# Patient Record
Sex: FEMALE | Race: WHITE
[De-identification: names, ages, dates, MRNs, and addresses within clinical notes are randomized per-mention and may not be internally consistent; named-entity substitution may affect disease eponyms.]

---

## 2017-09-26 ENCOUNTER — HOSPITAL ENCOUNTER (OUTPATIENT)
Dept: HOSPITAL 57 - BURLAB | Age: 72
Discharge: HOME | End: 2017-09-26
Payer: MEDICARE

## 2017-09-26 DIAGNOSIS — E55.9: Primary | ICD-10-CM

## 2017-09-26 DIAGNOSIS — D50.9: ICD-10-CM

## 2017-09-26 DIAGNOSIS — N18.4: ICD-10-CM

## 2017-09-26 DIAGNOSIS — I12.9: ICD-10-CM

## 2017-09-26 LAB
ANION GAP SERPL CALC-SCNC: 14 MMOL/L (ref 10–20)
BUN SERPL-MCNC: 17 MG/DL (ref 9.8–20.1)
CALCIUM SERPL-MCNC: 9.7 MG/DL (ref 7.8–10.44)
CHLORIDE SERPL-SCNC: 110 MMOL/L (ref 98–107)
CO2 SERPL-SCNC: 24 MMOL/L (ref 23–31)
CREAT CL PREDICTED SERPL C-G-VRATE: 0 ML/MIN (ref 70–130)
GLUCOSE SERPL-MCNC: 103 MG/DL (ref 83–110)
HGB BLD-MCNC: 11.6 G/DL (ref 12–16)
POTASSIUM SERPL-SCNC: 4 MMOL/L (ref 3.5–5.1)
PROT UR-MCNC: (no result) MG/DL
SODIUM SERPL-SCNC: 144 MMOL/L (ref 136–145)

## 2017-09-26 PROCEDURE — 85018 HEMOGLOBIN: CPT

## 2017-09-26 PROCEDURE — 84156 ASSAY OF PROTEIN URINE: CPT

## 2017-09-26 PROCEDURE — 84100 ASSAY OF PHOSPHORUS: CPT

## 2017-09-26 PROCEDURE — 82306 VITAMIN D 25 HYDROXY: CPT

## 2017-09-26 PROCEDURE — 36415 COLL VENOUS BLD VENIPUNCTURE: CPT

## 2017-09-26 PROCEDURE — 83970 ASSAY OF PARATHORMONE: CPT

## 2017-09-26 PROCEDURE — 82570 ASSAY OF URINE CREATININE: CPT

## 2017-09-26 PROCEDURE — 85014 HEMATOCRIT: CPT

## 2017-09-26 PROCEDURE — 80048 BASIC METABOLIC PNL TOTAL CA: CPT

## 2017-09-27 ENCOUNTER — HOSPITAL ENCOUNTER (OUTPATIENT)
Dept: HOSPITAL 57 - BURRAD | Age: 72
Discharge: HOME | End: 2017-09-27
Attending: FAMILY MEDICINE
Payer: MEDICARE

## 2017-09-27 DIAGNOSIS — R91.8: ICD-10-CM

## 2017-09-27 DIAGNOSIS — J42: Primary | ICD-10-CM

## 2017-09-27 PROCEDURE — 71020: CPT

## 2017-09-27 NOTE — RAD
CHEST TWO VIEWS:

9/27/17

 

No prior films are available for comparison. 

 

There is a little increased density around the right hilum and along its inferior aspect. This may j
ust be a small infiltrate, though I cannot rule out other pathology here. The left lung is clear. A 
calcified granuloma is seen in the left costophrenic angle. There are no effusions. The heart size i
s normal and the trachea is midline. 

 

IMPRESSION:  

Some increased density and prominence around the region of the right hilum. I cannot tell if it is s
olely infiltrative change or if other pathology is present. I would recommend treating the patient f
or pneumonia, then repeating the chest x-ray after treatment. If it is not cleared completely, then 
consider a CT of the thorax. Alternatively, one could do a CT sooner if the clinical history dictate
d. If would be preferred, should a CT be needed, to do it with IV contrast if that is possible. 

 

Code T

 

POS: HOME

## 2017-10-03 ENCOUNTER — HOSPITAL ENCOUNTER (OUTPATIENT)
Dept: HOSPITAL 57 - BURRAD | Age: 72
Discharge: HOME | End: 2017-10-03
Attending: FAMILY MEDICINE
Payer: MEDICARE

## 2017-10-03 DIAGNOSIS — J42: Primary | ICD-10-CM

## 2017-10-03 DIAGNOSIS — R91.8: ICD-10-CM

## 2017-10-03 PROCEDURE — 71020: CPT

## 2017-10-03 NOTE — RAD
CHEST TWO VIEWS

10/3/17

 

Comparison is made with the study of 9/27/17. 

 

While it is not quite as streaky around the right hilum as it was before, there is still a little ex
tra density here. I am more concerned that on the lateral view, there is sort of a rounded area of i
ncreased density in the anterior chest, possibly right middle lobe. Given a patient with COPD and th
is not immediately resolving, I feel strongly that an elective CT scan needs to be done to rule out 
mass or other pathology here. The lungs are otherwise clear. A calcified granuloma is seen in the le
ft costophrenic angle. The heart size remains normal. 

 

IMPRESSION:  

Slight improvement since 9/27 but a vague poorly marginated rounded density seen anteriorly on the l
ateral view remains of concern. CT strongly recommended to remove all doubt. 

 

Code T

 

POS: HOME

## 2017-10-05 ENCOUNTER — HOSPITAL ENCOUNTER (OUTPATIENT)
Dept: HOSPITAL 57 - BURCT | Age: 72
Discharge: HOME | End: 2017-10-05
Attending: FAMILY MEDICINE
Payer: MEDICARE

## 2017-10-05 DIAGNOSIS — R93.8: Primary | ICD-10-CM

## 2017-10-05 DIAGNOSIS — I25.10: ICD-10-CM

## 2017-10-05 PROCEDURE — 71250 CT THORAX DX C-: CPT

## 2017-10-05 NOTE — CT
CT OF THE THORAX WITHOUT CONTRAST

10/05/2017

 

Spiral CT of the chest was performed without IV contrast for evaluation of an abnormal chest x-ray. 
 IV contrast was deferred due to a low GFR.  Axial slices were acquired and then coronal reconstruct
ions were done.

 

There is an area of consolidation with air bronchograms in the medial part of the right middle lobe.
  The finding is consistent with pneumonia.  I do not see a discrete mass here or in the hilum, with
in the limitations of a noncontrast study.  The lungs are otherwise clear.  There are no effusions.

 

Calcification is seen in the coronary arteries to a mild to medium degree in the more proximal porti
on of the LAD but denser in the mid to distal portions of the right coronary artery.  There might be
 a trace of pericardial fluid present but certainly not any large amount.  No mediastinal masses wer
e seen.  Scans into the upper abdomen showed no acute changes in the areas visualized.

 

IMPRESSION:   

1. Abnormality on chest x-ray is infiltrative in nature and is presumed to be pneumonia.  No mass wa
s seen.

 

2. Coronary arterial sclerosis, both in the LAD and right coronary that may deserve further follow-u
p.

 

Findings discussed with Dr. Vega at 0916 hours on 10/05/2017.

 

 

POS: HOME

## 2018-04-30 ENCOUNTER — HOSPITAL ENCOUNTER (OUTPATIENT)
Dept: HOSPITAL 92 - RAD | Age: 73
Discharge: HOME | End: 2018-04-30
Attending: INTERNAL MEDICINE
Payer: MEDICARE

## 2018-04-30 DIAGNOSIS — R06.00: Primary | ICD-10-CM

## 2018-04-30 PROCEDURE — 71046 X-RAY EXAM CHEST 2 VIEWS: CPT

## 2018-04-30 NOTE — RAD
PA AND LATERAL VIEWS OF CHEST:

 

Date:  04/30/18 

 

HISTORY:  

Dyspnea. 

 

FINDINGS:

 

Comparison made with exam of 10/03/17. 

 

The heart size is normal. Evidence of old granulomatous disease is again seen. No focal areas of cons
olidation, pneumothorax, or pleural effusions are identified. There are mild degenerative changes in 
the spine. 

 

IMPRESSION: 

No radiographic evidence of acute cardiopulmonary process. 

 

 

POS: [facility]

## 2018-11-23 ENCOUNTER — HOSPITAL ENCOUNTER (EMERGENCY)
Dept: HOSPITAL 57 - BURERS | Age: 73
Discharge: HOME | End: 2018-11-23
Payer: MEDICARE

## 2018-11-23 DIAGNOSIS — J06.9: Primary | ICD-10-CM

## 2018-11-23 DIAGNOSIS — H66.92: ICD-10-CM

## 2018-11-23 PROCEDURE — 99283 EMERGENCY DEPT VISIT LOW MDM: CPT

## 2019-01-15 ENCOUNTER — HOSPITAL ENCOUNTER (OUTPATIENT)
Dept: HOSPITAL 92 - BICMAMMO | Age: 74
Discharge: HOME | End: 2019-01-15
Payer: MEDICARE

## 2019-01-15 DIAGNOSIS — Z80.3: ICD-10-CM

## 2019-01-15 DIAGNOSIS — Z12.31: Primary | ICD-10-CM

## 2019-01-15 PROCEDURE — 77063 BREAST TOMOSYNTHESIS BI: CPT

## 2019-01-15 PROCEDURE — 77067 SCR MAMMO BI INCL CAD: CPT

## 2019-03-25 ENCOUNTER — HOSPITAL ENCOUNTER (EMERGENCY)
Dept: HOSPITAL 57 - BURERS | Age: 74
Discharge: HOME | End: 2019-03-25
Payer: MEDICARE

## 2019-03-25 DIAGNOSIS — I10: ICD-10-CM

## 2019-03-25 DIAGNOSIS — E78.5: ICD-10-CM

## 2019-03-25 DIAGNOSIS — F17.210: ICD-10-CM

## 2019-03-25 DIAGNOSIS — Z79.899: ICD-10-CM

## 2019-03-25 DIAGNOSIS — J11.1: Primary | ICD-10-CM

## 2019-03-25 DIAGNOSIS — Z79.82: ICD-10-CM

## 2019-03-25 PROCEDURE — 71046 X-RAY EXAM CHEST 2 VIEWS: CPT

## 2019-03-25 PROCEDURE — 87804 INFLUENZA ASSAY W/OPTIC: CPT

## 2019-03-25 NOTE — RAD
CHEST TWO VIEWS:

 

Date: 3-25-19

 

Comparison: 4-30-18

 

FINDINGS: 

While there is no major lobar infiltrate, the lung markings behind the heart on the lateral view seen
 a little more prominent than they were previously. The upper lobes are clear. There are no effusions
. The heart size is normal. A small subcentimeter calcified granuloma is suggested in the left costop
hrenic angle. It is unchanged from 2018. 

 

IMPRESSION: 

Questionable retrocardiac streaking. I cannot exclude a minimal infiltrate in the lung base, probably
 right. A follow up film in a few days may be helpful if symptoms are not improving. 

 

POS: HOME

## 2019-09-28 ENCOUNTER — HOSPITAL ENCOUNTER (EMERGENCY)
Dept: HOSPITAL 57 - BURERS | Age: 74
Discharge: HOME | End: 2019-09-28
Payer: MEDICARE

## 2019-09-28 DIAGNOSIS — Z79.899: ICD-10-CM

## 2019-09-28 DIAGNOSIS — F17.210: ICD-10-CM

## 2019-09-28 DIAGNOSIS — I10: ICD-10-CM

## 2019-09-28 DIAGNOSIS — Z79.82: ICD-10-CM

## 2019-09-28 DIAGNOSIS — E86.0: Primary | ICD-10-CM

## 2019-09-28 DIAGNOSIS — R19.7: ICD-10-CM

## 2019-09-28 DIAGNOSIS — R11.2: ICD-10-CM

## 2019-09-28 LAB
ALBUMIN SERPL BCG-MCNC: 4.3 G/DL (ref 3.4–4.8)
ALP SERPL-CCNC: 73 U/L (ref 40–110)
ALT SERPL W P-5'-P-CCNC: 8 U/L (ref 8–55)
ANION GAP SERPL CALC-SCNC: 15 MMOL/L (ref 10–20)
AST SERPL-CCNC: 11 U/L (ref 5–34)
BASOPHILS # BLD AUTO: 0.1 THOU/UL (ref 0–0.2)
BASOPHILS NFR BLD AUTO: 0.7 % (ref 0–1)
BILIRUB SERPL-MCNC: 0.5 MG/DL (ref 0.2–1.2)
BUN SERPL-MCNC: 28 MG/DL (ref 9.8–20.1)
CALCIUM SERPL-MCNC: 9.3 MG/DL (ref 7.8–10.44)
CHLORIDE SERPL-SCNC: 111 MMOL/L (ref 98–107)
CO2 SERPL-SCNC: 21 MMOL/L (ref 23–31)
CREAT CL PREDICTED SERPL C-G-VRATE: 0 ML/MIN (ref 70–130)
EOSINOPHIL # BLD AUTO: 0.1 THOU/UL (ref 0–0.7)
EOSINOPHIL NFR BLD AUTO: 1.3 % (ref 0–10)
GLOBULIN SER CALC-MCNC: 2.7 G/DL (ref 2.4–3.5)
GLUCOSE SERPL-MCNC: 101 MG/DL (ref 83–110)
HGB BLD-MCNC: 12.3 G/DL (ref 12–16)
LYMPHOCYTES # BLD AUTO: 0.3 THOU/UL (ref 1.2–3.4)
LYMPHOCYTES NFR BLD AUTO: 3.7 % (ref 21–51)
MCH RBC QN AUTO: 31.8 PG (ref 27–31)
MCV RBC AUTO: 98 FL (ref 78–98)
MONOCYTES # BLD AUTO: 0.3 THOU/UL (ref 0.11–0.59)
MONOCYTES NFR BLD AUTO: 3.4 % (ref 0–10)
NEUTROPHILS # BLD AUTO: 8 THOU/UL (ref 1.4–6.5)
NEUTROPHILS NFR BLD AUTO: 90.8 % (ref 42–75)
PLATELET # BLD AUTO: 218 THOU/UL (ref 130–400)
POTASSIUM SERPL-SCNC: 4.3 MMOL/L (ref 3.5–5.1)
RBC # BLD AUTO: 3.87 MILL/UL (ref 4.2–5.4)
SODIUM SERPL-SCNC: 143 MMOL/L (ref 136–145)
WBC # BLD AUTO: 8.8 THOU/UL (ref 4.8–10.8)

## 2019-09-28 PROCEDURE — 85025 COMPLETE CBC W/AUTO DIFF WBC: CPT

## 2019-09-28 PROCEDURE — 80053 COMPREHEN METABOLIC PANEL: CPT

## 2019-09-28 PROCEDURE — 96361 HYDRATE IV INFUSION ADD-ON: CPT

## 2019-09-28 PROCEDURE — 96374 THER/PROPH/DIAG INJ IV PUSH: CPT

## 2020-01-16 ENCOUNTER — HOSPITAL ENCOUNTER (OUTPATIENT)
Dept: HOSPITAL 92 - BICMAMMO | Age: 75
Discharge: HOME | End: 2020-01-16
Payer: MEDICARE

## 2020-01-16 DIAGNOSIS — Z12.31: Primary | ICD-10-CM

## 2020-01-16 PROCEDURE — 77067 SCR MAMMO BI INCL CAD: CPT

## 2020-01-16 PROCEDURE — 77063 BREAST TOMOSYNTHESIS BI: CPT

## 2020-01-16 NOTE — MMO
Bilateral MAMMO Bilat Screen [facility].

 

CLINICAL HISTORY:

Patient is 74 years old and is seen for screening. The patient has no family

history of breast cancer.  The patient has no personal history of cancer.

 

VIEWS:

The views performed were:  bilateral craniocaudal with tomosynthesis and

bilateral mediolateral oblique with tomosynthesis.

 

FILMS COMPARED:

The present examination has been compared to a prior imaging study performed at

[facility] on 01/15/2019.

 

This study has been interpreted with the assistance of computer-aided detection.

 

MAMMOGRAM FINDINGS:

There are scattered fibroglandular densities.

 

There are no suspicious masses, suspicious calcifications, or new areas of

architectural distortion.

 

IMPRESSION:

THERE IS NO MAMMOGRAPHIC EVIDENCE OF MALIGNANCY.

 

A ROUTINE FOLLOW-UP MAMMOGRAM IN 1 YEAR IS RECOMMENDED.

 

THE RESULTS OF THIS EXAM WERE SENT TO THE PATIENT.

 

ACR BI-RADS Category 1 - Negative

 

MAMMOGRAPHY NOTE:

 1. A negative mammogram report should not delay a biopsy if a dominant of

 clinically suspicious mass is present.

 2. Approximately 10% to 15% of breast cancers are not detected by

 mammography.

 3. Adenosis and dense breasts may obscure an underlying neoplasm.

 

 

Reported by: MURRAY COLON MD

Electonically Signed: [id number]

## 2020-02-14 ENCOUNTER — HOSPITAL ENCOUNTER (OUTPATIENT)
Dept: HOSPITAL 57 - BURRAD | Age: 75
Discharge: HOME | End: 2020-02-14
Attending: FAMILY MEDICINE
Payer: MEDICARE

## 2020-02-14 DIAGNOSIS — M25.511: Primary | ICD-10-CM

## 2020-02-14 NOTE — RAD
RIGHT SHOULDER THREE VIEWS:

2/14/20

 

No fracture, dislocation, or periarticular calcification was seen. The AC joint is upper normal in wi
dth but shows no off-set. The visible adjacent ribs appear intact.

 

IMPRESSION: 

No acute findings. 

 

POS: HOME

## 2021-01-18 ENCOUNTER — HOSPITAL ENCOUNTER (OUTPATIENT)
Dept: HOSPITAL 92 - BICMAMMO | Age: 76
Discharge: HOME | End: 2021-01-18
Payer: MEDICARE

## 2021-01-18 DIAGNOSIS — Z12.31: Primary | ICD-10-CM

## 2021-01-18 DIAGNOSIS — Z80.3: ICD-10-CM

## 2021-01-18 PROCEDURE — 77067 SCR MAMMO BI INCL CAD: CPT

## 2021-01-18 PROCEDURE — 77063 BREAST TOMOSYNTHESIS BI: CPT

## 2021-01-18 NOTE — MMO
Bilateral MAMMO Bilat Screen [facility].

 

CLINICAL HISTORY:

Patient is 75 years old and is seen for screening. The patient has the following

family history of breast cancer:  mother.  The patient has no personal history

of cancer.

 

VIEWS:

The views performed were:  bilateral craniocaudal with tomosynthesis and

bilateral mediolateral oblique with tomosynthesis.

 

FILMS COMPARED:

The present examination has been compared to prior imaging studies performed at

[facility] on 01/15/2019 and 01/16/2020, and at [facility] on 02/04/2015 and 02/13/2015.

 

This study has been interpreted with the assistance of computer-aided detection.

 

MAMMOGRAM FINDINGS:

There are scattered fibroglandular densities.

 

There are no suspicious masses, suspicious calcifications, or new areas of

architectural distortion.

 

IMPRESSION:

THERE IS NO MAMMOGRAPHIC EVIDENCE OF MALIGNANCY.

 

A ROUTINE FOLLOW-UP MAMMOGRAM IN 1 YEAR IS RECOMMENDED.

 

THE RESULTS OF THIS EXAM WERE SENT TO THE PATIENT.

 

ACR BI-RADS Category 1 - Negative

 

MAMMOGRAPHY NOTE:

 1. A negative mammogram report should not delay a biopsy if a dominant of

 clinically suspicious mass is present.

 2. Approximately 10% to 15% of breast cancers are not detected by

 mammography.

 3. Adenosis and dense breasts may obscure an underlying neoplasm.

 

 

Reported by: RASHAWN PATEL MD

Electonically Signed: [id number]

## 2021-03-01 ENCOUNTER — HOSPITAL ENCOUNTER (OUTPATIENT)
Dept: HOSPITAL 92 - BICRAD | Age: 76
Discharge: HOME | End: 2021-03-01
Attending: INTERNAL MEDICINE
Payer: MEDICARE

## 2021-03-01 DIAGNOSIS — R06.00: Primary | ICD-10-CM

## 2021-03-01 PROCEDURE — 71046 X-RAY EXAM CHEST 2 VIEWS: CPT

## 2021-03-01 NOTE — RAD
PA AND LATERAL CHEST:

 

Date:  03/01/2021

 

HISTORY:  

Dyspnea. 

 

COMPARISON:  

03/25/2019 exam. 

 

FINDINGS:

Heart size and mediastinum are within normal limits. Lungs show chronic change without focal infiltra
viola. Arthritic changes of the spine are noted. 

 

IMPRESSION: 

Chronic lung change. 

 

 

POS: [facility]

## 2023-08-15 ENCOUNTER — HOSPITAL ENCOUNTER (OUTPATIENT)
Dept: HOSPITAL 92 - BICCT | Age: 78
Discharge: HOME | End: 2023-08-15
Attending: INTERNAL MEDICINE
Payer: MEDICARE

## 2023-08-15 DIAGNOSIS — Z12.2: Primary | ICD-10-CM

## 2023-08-15 DIAGNOSIS — F17.210: ICD-10-CM

## 2023-08-15 PROCEDURE — 71271 CT THORAX LUNG CANCER SCR C-: CPT
